# Patient Record
Sex: MALE | Race: WHITE
[De-identification: names, ages, dates, MRNs, and addresses within clinical notes are randomized per-mention and may not be internally consistent; named-entity substitution may affect disease eponyms.]

---

## 2018-03-04 ENCOUNTER — HOSPITAL ENCOUNTER (INPATIENT)
Dept: HOSPITAL 12 - ER | Age: 68
LOS: 17 days | Discharge: NURSING, REHAB OR LONG-TERM CARE | DRG: 885 | End: 2018-03-21
Payer: MEDICARE

## 2018-03-04 VITALS — BODY MASS INDEX: 24.38 KG/M2 | HEIGHT: 72 IN | WEIGHT: 180 LBS

## 2018-03-04 DIAGNOSIS — N28.1: ICD-10-CM

## 2018-03-04 DIAGNOSIS — Z91.81: ICD-10-CM

## 2018-03-04 DIAGNOSIS — I50.30: ICD-10-CM

## 2018-03-04 DIAGNOSIS — F31.2: Primary | ICD-10-CM
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
## 2018-03-04 DIAGNOSIS — F41.9: ICD-10-CM

## 2018-03-04 DIAGNOSIS — Z85.46: ICD-10-CM

## 2018-03-04 DIAGNOSIS — J44.9: ICD-10-CM

## 2018-03-04 DIAGNOSIS — I67.2: ICD-10-CM

## 2018-03-04 DIAGNOSIS — Z74.09: ICD-10-CM

## 2018-03-04 DIAGNOSIS — Z86.718: ICD-10-CM

## 2018-03-04 DIAGNOSIS — F45.8: ICD-10-CM

## 2018-03-04 DIAGNOSIS — N17.0: ICD-10-CM
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
## 2018-03-04 DIAGNOSIS — Z86.79: ICD-10-CM

## 2018-03-04 DIAGNOSIS — Z87.891: ICD-10-CM

## 2018-03-04 DIAGNOSIS — D64.9: ICD-10-CM

## 2018-03-04 DIAGNOSIS — E88.09: ICD-10-CM

## 2018-03-04 DIAGNOSIS — G20: ICD-10-CM

## 2018-03-04 DIAGNOSIS — I13.0: ICD-10-CM

## 2018-03-04 DIAGNOSIS — F03.90: ICD-10-CM

## 2018-03-04 DIAGNOSIS — K21.9: ICD-10-CM

## 2018-03-04 DIAGNOSIS — M89.9: ICD-10-CM

## 2018-03-04 DIAGNOSIS — Z87.820: ICD-10-CM

## 2018-03-04 DIAGNOSIS — D68.59: ICD-10-CM

## 2018-03-04 DIAGNOSIS — I27.20: ICD-10-CM

## 2018-03-04 DIAGNOSIS — N18.9: ICD-10-CM

## 2018-03-04 DIAGNOSIS — G90.9: ICD-10-CM

## 2018-03-04 DIAGNOSIS — S01.01XA: ICD-10-CM

## 2018-03-04 DIAGNOSIS — I70.90: ICD-10-CM

## 2018-03-04 DIAGNOSIS — I95.1: ICD-10-CM
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
## 2018-03-04 PROCEDURE — A4663 DIALYSIS BLOOD PRESSURE CUFF: HCPCS

## 2018-03-04 PROCEDURE — A9150 MISC/EXPER NON-PRESCRIPT DRU: HCPCS

## 2018-03-04 RX ADMIN — Medication PRN MG: at 22:00

## 2018-03-05 VITALS — DIASTOLIC BLOOD PRESSURE: 80 MMHG | SYSTOLIC BLOOD PRESSURE: 132 MMHG

## 2018-03-05 VITALS — SYSTOLIC BLOOD PRESSURE: 157 MMHG | DIASTOLIC BLOOD PRESSURE: 95 MMHG

## 2018-03-05 VITALS — DIASTOLIC BLOOD PRESSURE: 85 MMHG | SYSTOLIC BLOOD PRESSURE: 145 MMHG

## 2018-03-05 VITALS — SYSTOLIC BLOOD PRESSURE: 134 MMHG | DIASTOLIC BLOOD PRESSURE: 70 MMHG

## 2018-03-05 LAB
CHOLEST SERPL-MCNC: 112 MG/DL (ref ?–200)
GLUCOSE SERPL-MCNC: 91 MG/DL (ref 70–115)
HDLC SERPL-MCNC: 68 MG/DL (ref 40–60)
TRIGL SERPL-MCNC: 45 MG/DL (ref 30–150)

## 2018-03-05 RX ADMIN — CARBIDOPA AND LEVODOPA SCH TAB: 25; 250 TABLET ORAL at 20:19

## 2018-03-05 RX ADMIN — LORAZEPAM PRN MG: 0.5 TABLET ORAL at 02:57

## 2018-03-05 RX ADMIN — LAMOTRIGINE SCH MG: 100 TABLET ORAL at 10:00

## 2018-03-05 RX ADMIN — LAMOTRIGINE SCH MG: 100 TABLET ORAL at 20:19

## 2018-03-05 RX ADMIN — NICOTINE SCH MG: 21 PATCH, EXTENDED RELEASE TOPICAL at 17:07

## 2018-03-05 RX ADMIN — CARBIDOPA AND LEVODOPA SCH TAB: 25; 250 TABLET ORAL at 16:59

## 2018-03-05 RX ADMIN — MIDODRINE HYDROCHLORIDE SCH MG: 5 TABLET ORAL at 10:00

## 2018-03-05 RX ADMIN — THERA TABS SCH UDTAB: TAB at 10:00

## 2018-03-05 RX ADMIN — DIVALPROEX SODIUM SCH MG: 500 TABLET, EXTENDED RELEASE ORAL at 16:54

## 2018-03-05 RX ADMIN — CARBIDOPA AND LEVODOPA SCH TAB: 25; 250 TABLET ORAL at 13:57

## 2018-03-05 RX ADMIN — MIDODRINE HYDROCHLORIDE SCH MG: 5 TABLET ORAL at 16:55

## 2018-03-05 RX ADMIN — CARBIDOPA AND LEVODOPA SCH TAB: 25; 250 TABLET ORAL at 09:59

## 2018-03-05 RX ADMIN — DIVALPROEX SODIUM SCH MG: 500 TABLET, EXTENDED RELEASE ORAL at 09:59

## 2018-03-05 RX ADMIN — DOCUSATE SODIUM SCH MG: 100 CAPSULE, LIQUID FILLED ORAL at 09:59

## 2018-03-05 RX ADMIN — NICOTINE SCH MG: 21 PATCH, EXTENDED RELEASE TOPICAL at 14:45

## 2018-03-05 NOTE — NUR
PATIENT RECEIVED FROM ER VIA [facility] AT 2120. PATIENT ALERT/ORIENTED X1 WITH CONFUSION NOTED, 
DISORGANIZED, AGITATED, IRRITABLE, AND UNPREDICTABLE. PATIENT UNSTEADY HISTORY OF FALL. SKIN 
SHOW'S BRUISE TO BACK OF HEAD, BRUISE TO BACK, UPPER BACK BUMP, BRUISE TO LEFT AND RIGHT 
LEGS, CUTS TO LEFT FINGERS AND RIGHT FINGERS. PATIENT ORIENTED TO UNIT. SPOKE WITH GATO BOJORQUEZ 
[phone]. PATIENT FROM [facility] SPOKE WITH [facility] FAXED INFORMATION 
ON PATIENT [phone]. PATIENT RECEIVED PATIENT RIGHTS HANDBOOK, AND ADVISEMENT.

## 2018-03-05 NOTE — NUR
PATIENT RECEIVED IN ACTIVITIES ROOM SOCIALIZING WITH STAFF AND PEERS. PATIENT CALM, 
COOPERATIVE UPON APPROACH. IN NO APPARENT DISTRESS, WILL CONTINUE TO MONITOR. PATIENT USES 
FWW. PATIENT COMPLAINT WITH MEDICATION. NO AGGRESSIVE OR COMBATIVE BEHAVIOR NOTED WILL 
CONTINUE TO MONITOR. BED IN LOWEST POSITION, BED LOCKED, AND BED ALARM ON WHILE IN BED.

## 2018-03-06 VITALS — DIASTOLIC BLOOD PRESSURE: 85 MMHG | SYSTOLIC BLOOD PRESSURE: 121 MMHG

## 2018-03-06 VITALS — DIASTOLIC BLOOD PRESSURE: 91 MMHG | SYSTOLIC BLOOD PRESSURE: 138 MMHG

## 2018-03-06 VITALS — SYSTOLIC BLOOD PRESSURE: 122 MMHG | DIASTOLIC BLOOD PRESSURE: 71 MMHG

## 2018-03-06 RX ADMIN — CARBIDOPA AND LEVODOPA SCH TAB: 25; 250 TABLET ORAL at 08:50

## 2018-03-06 RX ADMIN — MIDODRINE HYDROCHLORIDE SCH MG: 5 TABLET ORAL at 08:51

## 2018-03-06 RX ADMIN — ACETAMINOPHEN PRN MG: 500 TABLET ORAL at 01:47

## 2018-03-06 RX ADMIN — NICOTINE SCH MG: 21 PATCH, EXTENDED RELEASE TOPICAL at 08:49

## 2018-03-06 RX ADMIN — CARBIDOPA AND LEVODOPA SCH TAB: 25; 250 TABLET ORAL at 20:15

## 2018-03-06 RX ADMIN — CARBIDOPA AND LEVODOPA SCH TAB: 25; 250 TABLET ORAL at 12:41

## 2018-03-06 RX ADMIN — LAMOTRIGINE SCH MG: 100 TABLET ORAL at 08:50

## 2018-03-06 RX ADMIN — LORAZEPAM PRN MG: 0.5 TABLET ORAL at 04:14

## 2018-03-06 RX ADMIN — Medication PRN MG: at 01:51

## 2018-03-06 RX ADMIN — DIVALPROEX SODIUM SCH MG: 500 TABLET, EXTENDED RELEASE ORAL at 08:50

## 2018-03-06 RX ADMIN — THERA TABS SCH UDTAB: TAB at 08:49

## 2018-03-06 RX ADMIN — MIDODRINE HYDROCHLORIDE SCH MG: 5 TABLET ORAL at 16:31

## 2018-03-06 RX ADMIN — LORAZEPAM PRN MG: 0.5 TABLET ORAL at 12:41

## 2018-03-06 RX ADMIN — DIVALPROEX SODIUM SCH MG: 500 TABLET, EXTENDED RELEASE ORAL at 16:30

## 2018-03-06 RX ADMIN — DOCUSATE SODIUM SCH MG: 100 CAPSULE, LIQUID FILLED ORAL at 08:49

## 2018-03-06 RX ADMIN — LAMOTRIGINE SCH MG: 100 TABLET ORAL at 20:15

## 2018-03-06 RX ADMIN — CARBIDOPA AND LEVODOPA SCH TAB: 25; 250 TABLET ORAL at 16:30

## 2018-03-06 NOTE — NUR
Initial Discharge Instructions:  Patient currently resides at [facility] [[address] [place] [address]; [phone]]. Per pt, he would like to 
return there upon discharge.  left message for Moses at the facility to confirm 
if he will be able to return there. SW will continue to collaborate with patient and MD 
regarding appropriate discharge plan. SW will form a safe and proper discharge.

## 2018-03-06 NOTE — NUR
RECEIVED PATIENT IN THE DAY ROOM. HE WAS NOTED A/O X 3. HE IS ABLE TO MAKE HIS NEEDS KNOWN 
AND ABLE TO AMBULATE WITH STEADY GAIT. PATIENT WAS NOTED CALM AND COOPERATIVE, PLEASANT, 
HYPERVERBAL, TANGENTAL. FAIR INSIGHT. HE DENIES SI, ABLE TO CFS. MEDICATION COMPLIANT AT 
THIS TIME. SAFETY EMPHASIS BED AT LOWEST POSITION WITH WHEELS LOCKED AND FREQUENT HEAD 
CHECKS. PT ENCOURAGED TO VERBALIZED FEELINGS.

## 2018-03-07 VITALS — SYSTOLIC BLOOD PRESSURE: 145 MMHG | DIASTOLIC BLOOD PRESSURE: 81 MMHG

## 2018-03-07 VITALS — SYSTOLIC BLOOD PRESSURE: 100 MMHG | DIASTOLIC BLOOD PRESSURE: 70 MMHG

## 2018-03-07 VITALS — SYSTOLIC BLOOD PRESSURE: 140 MMHG | DIASTOLIC BLOOD PRESSURE: 70 MMHG

## 2018-03-07 VITALS — DIASTOLIC BLOOD PRESSURE: 80 MMHG | SYSTOLIC BLOOD PRESSURE: 141 MMHG

## 2018-03-07 VITALS — SYSTOLIC BLOOD PRESSURE: 126 MMHG | DIASTOLIC BLOOD PRESSURE: 76 MMHG

## 2018-03-07 VITALS — DIASTOLIC BLOOD PRESSURE: 70 MMHG | SYSTOLIC BLOOD PRESSURE: 111 MMHG

## 2018-03-07 LAB
ALP SERPL-CCNC: 65 U/L (ref 50–136)
ALT SERPL W/O P-5'-P-CCNC: 80 U/L (ref 16–63)
APPEARANCE UR: CLEAR
AST SERPL-CCNC: 70 U/L (ref 15–37)
BASOPHILS # BLD AUTO: 0 K/UL (ref 0–8)
BASOPHILS NFR BLD AUTO: 0.5 % (ref 0–2)
BILIRUB SERPL-MCNC: 0.4 MG/DL (ref 0.2–1)
BILIRUB UR QL STRIP: NEGATIVE
BUN SERPL-MCNC: 24 MG/DL (ref 7–18)
CHLORIDE SERPL-SCNC: 104 MMOL/L (ref 98–107)
CO2 SERPL-SCNC: 25 MMOL/L (ref 21–32)
COLOR UR: (no result)
CREAT SERPL-MCNC: 1.9 MG/DL (ref 0.6–1.3)
CREAT UR-MCNC: < 13 MG/DL (ref 30–125)
EOSINOPHIL # BLD AUTO: 0.1 K/UL (ref 0–0.7)
EOSINOPHIL NFR BLD AUTO: 0.7 % (ref 0–7)
GLUCOSE SERPL-MCNC: 98 MG/DL (ref 74–106)
GLUCOSE UR STRIP-MCNC: NEGATIVE MG/DL
HCT VFR BLD AUTO: 36 % (ref 36.7–47.1)
HGB BLD-MCNC: 12.1 G/DL (ref 12.5–16.3)
HGB UR QL STRIP: NEGATIVE
KETONES UR STRIP-MCNC: NEGATIVE MG/DL
LEUKOCYTE ESTERASE UR QL STRIP: NEGATIVE
LYMPHOCYTES # BLD AUTO: 0.8 K/UL (ref 20–40)
LYMPHOCYTES NFR BLD AUTO: 8.9 % (ref 20.5–51.5)
MAGNESIUM SERPL-MCNC: 2.2 MG/DL (ref 1.8–2.4)
MCH RBC QN AUTO: 30.9 UUG (ref 23.8–33.4)
MCHC RBC AUTO-ENTMCNC: 34 G/DL (ref 32.5–36.3)
MCV RBC AUTO: 92.1 FL (ref 73–96.2)
MONOCYTES # BLD AUTO: 1.1 K/UL (ref 2–10)
MONOCYTES NFR BLD AUTO: 11.8 % (ref 0–11)
NEUTROPHILS # BLD AUTO: 7.3 K/UL (ref 1.8–8.9)
NEUTROPHILS NFR BLD AUTO: 78.1 % (ref 38.5–71.5)
NITRITE UR QL STRIP: NEGATIVE
PH UR STRIP: 7 [PH] (ref 5–8)
PHOSPHATE SERPL-MCNC: 3.2 MG/DL (ref 2.5–4.9)
PLATELET # BLD AUTO: 198 K/UL (ref 152–348)
POTASSIUM SERPL-SCNC: 4.9 MMOL/L (ref 3.5–5.1)
PROT UR QL STRIP: NEGATIVE
PROT UR-MCNC: < 6 MG/DL
RBC # BLD AUTO: 3.91 MIL/UL (ref 4.06–5.63)
SP GR UR STRIP: 1.01 (ref 1–1.03)
TSH SERPL DL<=0.005 MIU/L-ACNC: 1.71 MIU/ML (ref 0.36–3.74)
UROBILINOGEN UR STRIP-MCNC: 0.2 E.U./DL
WBC # BLD AUTO: 9.4 K/UL (ref 3.6–10.2)
WBC #/AREA URNS HPF: (no result) /HPF (ref 0–3)
WS STN SPEC: 6.7 G/DL (ref 6.4–8.2)

## 2018-03-07 RX ADMIN — LAMOTRIGINE SCH MG: 100 TABLET ORAL at 08:06

## 2018-03-07 RX ADMIN — CARBIDOPA AND LEVODOPA SCH TAB: 25; 250 TABLET ORAL at 08:06

## 2018-03-07 RX ADMIN — LORAZEPAM PRN MG: 0.5 TABLET ORAL at 14:35

## 2018-03-07 RX ADMIN — NICOTINE SCH MG: 21 PATCH, EXTENDED RELEASE TOPICAL at 08:06

## 2018-03-07 RX ADMIN — CARBIDOPA AND LEVODOPA SCH TAB: 25; 250 TABLET ORAL at 12:36

## 2018-03-07 RX ADMIN — DIVALPROEX SODIUM SCH MG: 500 TABLET, EXTENDED RELEASE ORAL at 08:06

## 2018-03-07 RX ADMIN — DIVALPROEX SODIUM SCH MG: 500 TABLET, EXTENDED RELEASE ORAL at 16:43

## 2018-03-07 RX ADMIN — CARBIDOPA AND LEVODOPA SCH TAB: 25; 250 TABLET ORAL at 16:43

## 2018-03-07 RX ADMIN — LAMOTRIGINE SCH MG: 100 TABLET ORAL at 20:15

## 2018-03-07 RX ADMIN — MIDODRINE HYDROCHLORIDE SCH MG: 5 TABLET ORAL at 08:44

## 2018-03-07 RX ADMIN — DOCUSATE SODIUM SCH MG: 100 CAPSULE, LIQUID FILLED ORAL at 08:06

## 2018-03-07 RX ADMIN — ACETAMINOPHEN PRN MG: 500 TABLET ORAL at 06:19

## 2018-03-07 RX ADMIN — CARBIDOPA AND LEVODOPA SCH TAB: 25; 250 TABLET ORAL at 20:15

## 2018-03-07 RX ADMIN — MIDODRINE HYDROCHLORIDE SCH MG: 5 TABLET ORAL at 16:42

## 2018-03-07 RX ADMIN — LORAZEPAM PRN MG: 0.5 TABLET ORAL at 03:49

## 2018-03-07 RX ADMIN — THERA TABS SCH UDTAB: TAB at 08:06

## 2018-03-07 RX ADMIN — QUETIAPINE SCH MG: 100 TABLET, FILM COATED ORAL at 20:16

## 2018-03-07 NOTE — NUR
RECEIVED PATIENT IN THE DAY ROOM. HE WAS NOTED A/O X 3, AMBULATES INDEPENDENTLY WITH STEADY 
GAIT AND BALANCE, TREMORS R/T HX OF PARKINSON'S DISEASE. PATIENT WAS COOPERATIVE, PLEASANT, 
HYPERVERBAL, TANGENTIAL. FAIR INSIGHT. DENIES SI/HI, ABLE TO CFS. PATIENT'S BRUISING AND 
CUTS THAT WERE NOTED IN H&P HAVE IMPROVED. PATIENT HAS NOT COMPLAINED OF PAIN. COMPLIANT TO 
MEDICATIONS AT THIS TIME. SAFETY EMPHASIZED; BED AT LOWEST POSITION WITH WHEELS LOCKED AND 
FREQUENT HEAD CHECKS. PATIENT ENCOURAGED TO VERBALIZE FEELINGS.

## 2018-03-07 NOTE — NUR
PATIENT WOKE UP AND CAME TO THE NURSING STATION ASKING FOR A SLEEPING PILL. UPON ASSESSMENT, 
PATIENT WAS NOTED A LITTLE ANXIOUS. ATIVAN 0.5MG PO PRN WAS GIVEN FOR ANXIETY. WILL CONTINUE 
TO MONITOR.

## 2018-03-07 NOTE — NUR
PATIENT SLEPT FOR APPROX 4.00 HRS THROUGH THE NIGHT. HE CONTINUE HYPERVERBAL, FLIGHT OF 
IDEAS; HOWEVER NO SI, NO AGGRESSIVE BX WAS NOTED AT THIS TIME. COMPLIANT WITH ADLs. PT   C/O 
LOWER BACK PAIN, TYLENOL 500MG ES PO PRN WAS GIVEN. WILL CONTINUE TO MONITOR.

## 2018-03-07 NOTE — NUR
AT APPROXIMATELY 2229, PATIENT WAS WALKING DOWN THE HALLWAY FROM DAY ROOM TO HIS ROOM AND 
WAS NOTED TO HAVE PALE SKIN. PATIENT WAS IMMEDIATELY SEATED ON A CHAIR IN THE HALLWAY AND 
V/S WERE ASSESSED: B/P OF 89/56; HR 78; 96% O2 SAT, RESPONSIVE, A/O X 2. PATIENT REPORTED 
LIGHT HEADEDNESS AND DIZZINESS. TRANSFERRED PATIENT TO BED, PLACED ON TRENDELENBURG POSITION 
AND CONTINUED MONITORING V/S EVERY 5 MINUTES FOR 30 MINUTES. SYSTOLIC B/P WAS STABLE AND AT 
A CONSISTENT RANGE BETWEEN 90 , DIASTOLIC B/P WAS STABLE AND AT A CONSISTENT RANGE 
BETWEEN 60 AND 70, PATIENT ASLEEP BUT AROUSABLE, RESPONSIVE, LETHARGIC. BED POSITION WAS 
CHANGED FROM TRENDELENBURG TO LITHOTOMY POSITION, V/S WERE ASSESSED AFTER 10 MINUTES AT 
2310: B/P /70; HR 80, 96% O2 SAT, RR 17. PATIENT WAS NOTED TO BE ASLEEP AND STABLE. 
WILL CONTINUE TO FREQUENTLY MONITOR PATIENT.

## 2018-03-08 VITALS — SYSTOLIC BLOOD PRESSURE: 131 MMHG | DIASTOLIC BLOOD PRESSURE: 82 MMHG

## 2018-03-08 VITALS — SYSTOLIC BLOOD PRESSURE: 146 MMHG | DIASTOLIC BLOOD PRESSURE: 78 MMHG

## 2018-03-08 VITALS — SYSTOLIC BLOOD PRESSURE: 132 MMHG | DIASTOLIC BLOOD PRESSURE: 77 MMHG

## 2018-03-08 LAB
ALP SERPL-CCNC: 66 U/L (ref 50–136)
ALT SERPL W/O P-5'-P-CCNC: 43 U/L (ref 16–63)
AST SERPL-CCNC: 56 U/L (ref 15–37)
BASOPHILS # BLD AUTO: 0 K/UL (ref 0–8)
BASOPHILS NFR BLD AUTO: 0.5 % (ref 0–2)
BILIRUB SERPL-MCNC: 0.4 MG/DL (ref 0.2–1)
BUN SERPL-MCNC: 25 MG/DL (ref 7–18)
CHLORIDE SERPL-SCNC: 106 MMOL/L (ref 98–107)
CK SERPL-CCNC: 427 U/L (ref 39–308)
CO2 SERPL-SCNC: 30 MMOL/L (ref 21–32)
CREAT SERPL-MCNC: 1.8 MG/DL (ref 0.6–1.3)
EOSINOPHIL # BLD AUTO: 0.1 K/UL (ref 0–0.7)
EOSINOPHIL NFR BLD AUTO: 1.7 % (ref 0–7)
GLUCOSE SERPL-MCNC: 100 MG/DL (ref 74–106)
HCT VFR BLD AUTO: 36 % (ref 36.7–47.1)
HGB BLD-MCNC: 12 G/DL (ref 12.5–16.3)
LYMPHOCYTES # BLD AUTO: 1.2 K/UL (ref 20–40)
LYMPHOCYTES NFR BLD AUTO: 14.7 % (ref 20.5–51.5)
MAGNESIUM SERPL-MCNC: 2.3 MG/DL (ref 1.8–2.4)
MCH RBC QN AUTO: 30.8 UUG (ref 23.8–33.4)
MCHC RBC AUTO-ENTMCNC: 33 G/DL (ref 32.5–36.3)
MCV RBC AUTO: 92.5 FL (ref 73–96.2)
MONOCYTES # BLD AUTO: 1 K/UL (ref 2–10)
MONOCYTES NFR BLD AUTO: 12.9 % (ref 0–11)
NEUTROPHILS # BLD AUTO: 5.6 K/UL (ref 1.8–8.9)
NEUTROPHILS NFR BLD AUTO: 70.2 % (ref 38.5–71.5)
PHOSPHATE SERPL-MCNC: 3.4 MG/DL (ref 2.5–4.9)
PLATELET # BLD AUTO: 219 K/UL (ref 152–348)
POTASSIUM SERPL-SCNC: 5.1 MMOL/L (ref 3.5–5.1)
RBC # BLD AUTO: 3.89 MIL/UL (ref 4.06–5.63)
WBC # BLD AUTO: 8 K/UL (ref 3.6–10.2)
WS STN SPEC: 6.7 G/DL (ref 6.4–8.2)

## 2018-03-08 RX ADMIN — ALUMINUM HYDROXIDE, MAGNESIUM HYDROXIDE, AND SIMETHICONE PRN ML: 200; 200; 20 SUSPENSION ORAL at 16:15

## 2018-03-08 RX ADMIN — LORAZEPAM PRN MG: 0.5 TABLET ORAL at 23:54

## 2018-03-08 RX ADMIN — THERA TABS SCH UDTAB: TAB at 08:37

## 2018-03-08 RX ADMIN — LAMOTRIGINE SCH MG: 100 TABLET ORAL at 20:28

## 2018-03-08 RX ADMIN — DIVALPROEX SODIUM SCH MG: 500 TABLET, EXTENDED RELEASE ORAL at 16:23

## 2018-03-08 RX ADMIN — MIDODRINE HYDROCHLORIDE SCH MG: 5 TABLET ORAL at 16:27

## 2018-03-08 RX ADMIN — MIDODRINE HYDROCHLORIDE SCH MG: 5 TABLET ORAL at 08:38

## 2018-03-08 RX ADMIN — CARBIDOPA AND LEVODOPA SCH TAB: 25; 250 TABLET ORAL at 12:49

## 2018-03-08 RX ADMIN — CARBIDOPA AND LEVODOPA SCH TAB: 25; 250 TABLET ORAL at 08:37

## 2018-03-08 RX ADMIN — NICOTINE SCH MG: 21 PATCH, EXTENDED RELEASE TOPICAL at 08:38

## 2018-03-08 RX ADMIN — DOCUSATE SODIUM SCH MG: 100 CAPSULE, LIQUID FILLED ORAL at 08:37

## 2018-03-08 RX ADMIN — CARBIDOPA AND LEVODOPA SCH TAB: 25; 250 TABLET ORAL at 20:28

## 2018-03-08 RX ADMIN — CARBIDOPA AND LEVODOPA SCH TAB: 25; 250 TABLET ORAL at 16:24

## 2018-03-08 RX ADMIN — DIVALPROEX SODIUM SCH MG: 500 TABLET, EXTENDED RELEASE ORAL at 08:37

## 2018-03-08 RX ADMIN — LAMOTRIGINE SCH MG: 100 TABLET ORAL at 08:37

## 2018-03-08 RX ADMIN — QUETIAPINE SCH MG: 100 TABLET, FILM COATED ORAL at 20:29

## 2018-03-08 NOTE — NUR
PATIENT WHILE IN THE DAY ROOM WAS NOTED WITH DESTRUCTIVE BX. HE PULLED FEW DECORATIONS, 
THROW MAGAZINES AND PAPER ON THE FLOOR. PT WAS REDIRECTED, HOWEVER, HE BECOME SARCASTIC 
REFUSING TO CLEAN AFTER HIMSELF. WILL CONTINUE TO MONITOR.

## 2018-03-09 VITALS — SYSTOLIC BLOOD PRESSURE: 140 MMHG | DIASTOLIC BLOOD PRESSURE: 84 MMHG

## 2018-03-09 VITALS — DIASTOLIC BLOOD PRESSURE: 81 MMHG | SYSTOLIC BLOOD PRESSURE: 142 MMHG

## 2018-03-09 VITALS — SYSTOLIC BLOOD PRESSURE: 138 MMHG | DIASTOLIC BLOOD PRESSURE: 75 MMHG

## 2018-03-09 VITALS — DIASTOLIC BLOOD PRESSURE: 76 MMHG | SYSTOLIC BLOOD PRESSURE: 140 MMHG

## 2018-03-09 VITALS — SYSTOLIC BLOOD PRESSURE: 139 MMHG | DIASTOLIC BLOOD PRESSURE: 76 MMHG

## 2018-03-09 VITALS — DIASTOLIC BLOOD PRESSURE: 80 MMHG | SYSTOLIC BLOOD PRESSURE: 152 MMHG

## 2018-03-09 LAB
ALBUMIN SERPL ELPH-MCNC: 3.4 G/DL (ref 2.9–4.4)
ALBUMIN/GLOB SERPL: 1.3 {RATIO} (ref 0.7–1.7)
ALPHA1 GLOB SERPL ELPH-MCNC: 0.3 G/DL (ref 0–0.4)
ALPHA2 GLOB SERPL ELPH-MCNC: 0.7 G/DL (ref 0.4–1)
B-GLOBULIN SERPL ELPH-MCNC: 0.8 G/DL (ref 0.7–1.3)
GAMMA GLOB SERPL ELPH-MCNC: 0.7 G/DL (ref 0.4–1.8)
GLOBULIN SER CALC-MCNC: 2.6 G/DL (ref 2.2–3.9)

## 2018-03-09 RX ADMIN — CARBIDOPA AND LEVODOPA SCH TAB: 25; 250 TABLET ORAL at 21:30

## 2018-03-09 RX ADMIN — DIVALPROEX SODIUM SCH MG: 500 TABLET, EXTENDED RELEASE ORAL at 08:10

## 2018-03-09 RX ADMIN — LAMOTRIGINE SCH MG: 100 TABLET ORAL at 21:31

## 2018-03-09 RX ADMIN — DIVALPROEX SODIUM SCH MG: 500 TABLET, EXTENDED RELEASE ORAL at 16:30

## 2018-03-09 RX ADMIN — NICOTINE SCH MG: 21 PATCH, EXTENDED RELEASE TOPICAL at 08:10

## 2018-03-09 RX ADMIN — DOCUSATE SODIUM SCH MG: 100 CAPSULE, LIQUID FILLED ORAL at 08:10

## 2018-03-09 RX ADMIN — THERA TABS SCH UDTAB: TAB at 08:10

## 2018-03-09 RX ADMIN — CARBIDOPA AND LEVODOPA SCH TAB: 25; 250 TABLET ORAL at 16:30

## 2018-03-09 RX ADMIN — CARBIDOPA AND LEVODOPA SCH TAB: 25; 250 TABLET ORAL at 13:35

## 2018-03-09 RX ADMIN — MIDODRINE HYDROCHLORIDE SCH MG: 5 TABLET ORAL at 21:00

## 2018-03-09 RX ADMIN — LAMOTRIGINE SCH MG: 100 TABLET ORAL at 08:10

## 2018-03-09 RX ADMIN — QUETIAPINE SCH MG: 100 TABLET, FILM COATED ORAL at 21:31

## 2018-03-09 RX ADMIN — MIDODRINE HYDROCHLORIDE SCH MG: 5 TABLET ORAL at 08:17

## 2018-03-09 RX ADMIN — CARBIDOPA AND LEVODOPA SCH TAB: 25; 250 TABLET ORAL at 08:10

## 2018-03-09 NOTE — NUR
Gps/Lvn- Anxious, talkative, needed to be redirected. B/P sitting 140/76 HR 97, standing 
131/74 HR 94 , 02 sat 96% on room air. Ativan 0.5 mg was given po, minimal results. Shelia Alex NP was notified, in to see patient orders received.

## 2018-03-09 NOTE — NUR
PATIENT WHILE IN THE DAY ROOM WAS NOTED WITH DESTRUCTIVE BX. HE PULLED FEW DECORATIONS, 
THROW MAGAZINES AND PAPER ON THE FLOOR. PT WAS REDIRECTED AND WAS COOPERATIVE WITH RN. WHILE 
IN HIS ROOM, PT WAS EXHIBITING DISRUPTIVE BX (SINGING OUT LOUD EVEN WHEN ASKED TO STOP), WAS 
UNCOOPERATIVE AND AGGRESSIVE TOWARDS OTHER STAFF (OTHER NURSES). RN WAS ABLE TO REDIRECT PT, 
BUT STILL EXHIBITED ANXIETY BX, ATIVAN 0.5 MG PO WAS GIVEN @ 2359 TO HELP REDUCE ANXIETY. PT 
WAS REASSESSED WITHIN ONE HOUR AND MEDICATION NOT EFFECTIVE. PT STILL EXHIBITED ANXIETY BX, 
HYPERVERBAL, TANGENTAL SPEECH, COOPERATIVE. PT STAYED AWAKE THE WHOLE NIGHT AND CONTINUED TO 
BE AGGRESSIVE TOWARDS OTHER STAFF. AT APPROX 0330, PT CAME OUT OF ROOM EXHIBITING 
DESTRUCTIVE BX AND TALKING LOUDLY WHILE AMBULATING IN HALLWAY WITH FWW. LVN (KARLOS FITCH) 
ASKED PT TO GO BACK IN HIS ROOM AND TO LOWER HIS VOICE. PATIENT BECAME AGITATED, RAISED HIS 
FWW, AND BEGAN TO SWING AND HIT LVN WITH FWW. PATIENT WAS NEUTRALIZED BY STAFF AND PLACED IN 
LIZETTE CHAIR NEXT TO NURSE STATION FOR SAFETY. WILL CONTINUE TO MONITOR.

## 2018-03-10 VITALS — DIASTOLIC BLOOD PRESSURE: 86 MMHG | SYSTOLIC BLOOD PRESSURE: 131 MMHG

## 2018-03-10 VITALS — DIASTOLIC BLOOD PRESSURE: 75 MMHG | SYSTOLIC BLOOD PRESSURE: 146 MMHG

## 2018-03-10 VITALS — DIASTOLIC BLOOD PRESSURE: 72 MMHG | SYSTOLIC BLOOD PRESSURE: 153 MMHG

## 2018-03-10 RX ADMIN — QUETIAPINE SCH MG: 100 TABLET, FILM COATED ORAL at 20:38

## 2018-03-10 RX ADMIN — LAMOTRIGINE SCH MG: 100 TABLET ORAL at 09:00

## 2018-03-10 RX ADMIN — MIDODRINE HYDROCHLORIDE SCH MG: 5 TABLET ORAL at 20:39

## 2018-03-10 RX ADMIN — CARBIDOPA AND LEVODOPA SCH TAB: 25; 250 TABLET ORAL at 09:00

## 2018-03-10 RX ADMIN — CARBIDOPA AND LEVODOPA SCH TAB: 25; 250 TABLET ORAL at 13:00

## 2018-03-10 RX ADMIN — CARBIDOPA AND LEVODOPA SCH TAB: 25; 250 TABLET ORAL at 17:03

## 2018-03-10 RX ADMIN — THERA TABS SCH UDTAB: TAB at 09:00

## 2018-03-10 RX ADMIN — NICOTINE SCH MG: 21 PATCH, EXTENDED RELEASE TOPICAL at 09:00

## 2018-03-10 RX ADMIN — DOCUSATE SODIUM SCH MG: 100 CAPSULE, LIQUID FILLED ORAL at 09:00

## 2018-03-10 RX ADMIN — DIVALPROEX SODIUM SCH MG: 500 TABLET, EXTENDED RELEASE ORAL at 17:02

## 2018-03-10 RX ADMIN — MIDODRINE HYDROCHLORIDE SCH MG: 5 TABLET ORAL at 09:00

## 2018-03-10 RX ADMIN — CARBIDOPA AND LEVODOPA SCH TAB: 25; 250 TABLET ORAL at 20:38

## 2018-03-10 RX ADMIN — LAMOTRIGINE SCH MG: 100 TABLET ORAL at 20:38

## 2018-03-10 RX ADMIN — DIVALPROEX SODIUM SCH MG: 500 TABLET, EXTENDED RELEASE ORAL at 09:00

## 2018-03-10 RX ADMIN — LORAZEPAM PRN MG: 0.5 TABLET ORAL at 10:56

## 2018-03-10 NOTE — NUR
GPS/NSG

Patient first observed awake in room, alert, oriented to name and place. Patient refused 
vital signs. Patient visible at the nursing station asking for medication, pleasant fair 
appearance, appropriate approach to staff. Continue to monitor for safety.

## 2018-03-10 NOTE — NUR
Patient awake and visible on the unit, patient loud and required redirecting. Patient under 
the impression that HS medication was administered at 1700, patient appeared angry when 
redirected. Continue to monitor.

## 2018-03-10 NOTE — NUR
GPS/NSG

Proamatine due at 2100 held for B/P parameters. Continue to monitor orthostatic blood 
pressure before administration.

## 2018-03-11 VITALS — DIASTOLIC BLOOD PRESSURE: 85 MMHG | SYSTOLIC BLOOD PRESSURE: 159 MMHG

## 2018-03-11 VITALS — DIASTOLIC BLOOD PRESSURE: 74 MMHG | SYSTOLIC BLOOD PRESSURE: 152 MMHG

## 2018-03-11 VITALS — SYSTOLIC BLOOD PRESSURE: 127 MMHG | DIASTOLIC BLOOD PRESSURE: 73 MMHG

## 2018-03-11 LAB
ALP SERPL-CCNC: 69 U/L (ref 50–136)
ALT SERPL W/O P-5'-P-CCNC: 27 U/L (ref 16–63)
AST SERPL-CCNC: 37 U/L (ref 15–37)
BASOPHILS # BLD AUTO: 0 K/UL (ref 0–8)
BASOPHILS NFR BLD AUTO: 0.5 % (ref 0–2)
BILIRUB SERPL-MCNC: 0.4 MG/DL (ref 0.2–1)
BUN SERPL-MCNC: 19 MG/DL (ref 7–18)
CHLORIDE SERPL-SCNC: 108 MMOL/L (ref 98–107)
CO2 SERPL-SCNC: 26 MMOL/L (ref 21–32)
CREAT SERPL-MCNC: 1.9 MG/DL (ref 0.6–1.3)
EOSINOPHIL # BLD AUTO: 0.2 K/UL (ref 0–0.7)
EOSINOPHIL NFR BLD AUTO: 2.1 % (ref 0–7)
EOSINOPHIL NFR BLD MANUAL: 2 % (ref 0–8)
GLUCOSE SERPL-MCNC: 90 MG/DL (ref 74–106)
HCT VFR BLD AUTO: 35.9 % (ref 36.7–47.1)
HGB BLD-MCNC: 11.9 G/DL (ref 12.5–16.3)
LYMPHOCYTES # BLD AUTO: 1.5 K/UL (ref 20–40)
LYMPHOCYTES NFR BLD AUTO: 16.7 % (ref 20.5–51.5)
LYMPHOCYTES NFR BLD MANUAL: 17 % (ref 20–40)
MAGNESIUM SERPL-MCNC: 2.4 MG/DL (ref 1.8–2.4)
MCH RBC QN AUTO: 30.7 UUG (ref 23.8–33.4)
MCHC RBC AUTO-ENTMCNC: 33 G/DL (ref 32.5–36.3)
MCV RBC AUTO: 92.8 FL (ref 73–96.2)
MONOCYTES # BLD AUTO: 1.4 K/UL (ref 2–10)
MONOCYTES NFR BLD AUTO: 15.8 % (ref 0–11)
MONOCYTES NFR BLD MANUAL: 11 % (ref 2–10)
MYELOCYTES NFR BLD MANUAL: 1 % (ref 0–0)
NEUTROPHILS # BLD AUTO: 5.8 K/UL (ref 1.8–8.9)
NEUTROPHILS NFR BLD AUTO: 64.9 % (ref 38.5–71.5)
NEUTS BAND NFR BLD MANUAL: 1 % (ref 0–10)
NEUTS SEG NFR BLD MANUAL: 68 % (ref 42–75)
PHOSPHATE SERPL-MCNC: 3.4 MG/DL (ref 2.5–4.9)
PLATELET # BLD AUTO: 270 K/UL (ref 152–348)
POTASSIUM SERPL-SCNC: 4.7 MMOL/L (ref 3.5–5.1)
RBC # BLD AUTO: 3.87 MIL/UL (ref 4.06–5.63)
WBC # BLD AUTO: 9 K/UL (ref 3.6–10.2)
WS STN SPEC: 7.1 G/DL (ref 6.4–8.2)

## 2018-03-11 RX ADMIN — DIVALPROEX SODIUM SCH MG: 500 TABLET, EXTENDED RELEASE ORAL at 09:13

## 2018-03-11 RX ADMIN — LAMOTRIGINE SCH MG: 100 TABLET ORAL at 09:15

## 2018-03-11 RX ADMIN — LAMOTRIGINE SCH MG: 100 TABLET ORAL at 20:43

## 2018-03-11 RX ADMIN — DOCUSATE SODIUM SCH MG: 100 CAPSULE, LIQUID FILLED ORAL at 09:13

## 2018-03-11 RX ADMIN — CARBIDOPA AND LEVODOPA SCH TAB: 25; 250 TABLET ORAL at 09:13

## 2018-03-11 RX ADMIN — NICOTINE SCH MG: 21 PATCH, EXTENDED RELEASE TOPICAL at 12:17

## 2018-03-11 RX ADMIN — LORAZEPAM PRN MG: 0.5 TABLET ORAL at 12:06

## 2018-03-11 RX ADMIN — CARBIDOPA AND LEVODOPA SCH TAB: 25; 250 TABLET ORAL at 12:06

## 2018-03-11 RX ADMIN — DIVALPROEX SODIUM SCH MG: 250 TABLET, EXTENDED RELEASE ORAL at 16:57

## 2018-03-11 RX ADMIN — CARBIDOPA AND LEVODOPA SCH TAB: 25; 250 TABLET ORAL at 16:57

## 2018-03-11 RX ADMIN — CARBIDOPA AND LEVODOPA SCH TAB: 25; 250 TABLET ORAL at 20:43

## 2018-03-11 RX ADMIN — ACETAMINOPHEN PRN MG: 500 TABLET ORAL at 15:39

## 2018-03-11 RX ADMIN — THERA TABS SCH UDTAB: TAB at 09:13

## 2018-03-11 RX ADMIN — QUETIAPINE SCH MG: 100 TABLET, FILM COATED ORAL at 20:44

## 2018-03-11 RX ADMIN — DIVALPROEX SODIUM SCH MG: 250 TABLET, EXTENDED RELEASE ORAL at 12:13

## 2018-03-11 RX ADMIN — MIDODRINE HYDROCHLORIDE SCH MG: 5 TABLET ORAL at 09:00

## 2018-03-11 NOTE — NUR
GPS/NSG

Patient first observed awake on unit with labile mood, blunted affect. Patient visible in 
and out of room, compliant with medication. Proamatine due at 2100 held for B/P parameters. 
Continue to monitor orthostatic blood pressure before administration. Patient observed at 
nursing station periodically with increased agitation, demanding, verbally abusive towards 
staff inappropriate behavior loud, disruptive, taking things posted on the wall, banging on 
the tables in activity room, responding to internal stimuli, reinforcement by security was 
necessary to reduce level of agitation, patient was receptive, will continue to monitor 
behavior for patient safety.

## 2018-03-12 VITALS — SYSTOLIC BLOOD PRESSURE: 142 MMHG | DIASTOLIC BLOOD PRESSURE: 85 MMHG

## 2018-03-12 VITALS — DIASTOLIC BLOOD PRESSURE: 81 MMHG | SYSTOLIC BLOOD PRESSURE: 138 MMHG

## 2018-03-12 VITALS — SYSTOLIC BLOOD PRESSURE: 148 MMHG | DIASTOLIC BLOOD PRESSURE: 75 MMHG

## 2018-03-12 RX ADMIN — THERA TABS SCH UDTAB: TAB at 09:00

## 2018-03-12 RX ADMIN — CARBIDOPA AND LEVODOPA SCH TAB: 25; 250 TABLET ORAL at 20:47

## 2018-03-12 RX ADMIN — CARBIDOPA AND LEVODOPA SCH TAB: 25; 250 TABLET ORAL at 12:26

## 2018-03-12 RX ADMIN — LORAZEPAM PRN MG: 0.5 TABLET ORAL at 08:35

## 2018-03-12 RX ADMIN — THERA TABS SCH UDTAB: TAB at 08:35

## 2018-03-12 RX ADMIN — DOCUSATE SODIUM SCH MG: 100 CAPSULE, LIQUID FILLED ORAL at 08:34

## 2018-03-12 RX ADMIN — BACITRACIN ZINC AND POLYMYXIN B SULFATE SCH APPLIC: 500; 10000 OINTMENT TOPICAL at 17:59

## 2018-03-12 RX ADMIN — DIVALPROEX SODIUM SCH MG: 250 TABLET, EXTENDED RELEASE ORAL at 08:34

## 2018-03-12 RX ADMIN — LAMOTRIGINE SCH MG: 100 TABLET ORAL at 20:47

## 2018-03-12 RX ADMIN — BACITRACIN ZINC AND POLYMYXIN B SULFATE SCH APPLIC: 500; 10000 OINTMENT TOPICAL at 20:51

## 2018-03-12 RX ADMIN — NICOTINE SCH MG: 21 PATCH, EXTENDED RELEASE TOPICAL at 08:35

## 2018-03-12 RX ADMIN — CARBIDOPA AND LEVODOPA SCH TAB: 25; 250 TABLET ORAL at 08:35

## 2018-03-12 RX ADMIN — CARBIDOPA AND LEVODOPA SCH TAB: 25; 250 TABLET ORAL at 17:59

## 2018-03-12 RX ADMIN — QUETIAPINE SCH MG: 100 TABLET, FILM COATED ORAL at 20:47

## 2018-03-12 RX ADMIN — DIVALPROEX SODIUM SCH MG: 250 TABLET, EXTENDED RELEASE ORAL at 17:59

## 2018-03-12 RX ADMIN — Medication SCH MG: at 20:48

## 2018-03-12 RX ADMIN — Medication SCH MG: at 12:26

## 2018-03-12 RX ADMIN — LAMOTRIGINE SCH MG: 100 TABLET ORAL at 08:35

## 2018-03-12 NOTE — NUR
Noted dried cuts on fingers, antibiotic treatment initiated. Eating fairly. Calm and 
compliant with care.

## 2018-03-13 VITALS — SYSTOLIC BLOOD PRESSURE: 140 MMHG | DIASTOLIC BLOOD PRESSURE: 74 MMHG

## 2018-03-13 VITALS — DIASTOLIC BLOOD PRESSURE: 78 MMHG | SYSTOLIC BLOOD PRESSURE: 123 MMHG

## 2018-03-13 VITALS — SYSTOLIC BLOOD PRESSURE: 124 MMHG | DIASTOLIC BLOOD PRESSURE: 91 MMHG

## 2018-03-13 RX ADMIN — BACITRACIN ZINC AND POLYMYXIN B SULFATE SCH APPLIC: 500; 10000 OINTMENT TOPICAL at 20:34

## 2018-03-13 RX ADMIN — THERA TABS SCH UDTAB: TAB at 09:42

## 2018-03-13 RX ADMIN — CARBIDOPA AND LEVODOPA SCH TAB: 25; 250 TABLET ORAL at 10:36

## 2018-03-13 RX ADMIN — ACETAMINOPHEN PRN MG: 500 TABLET ORAL at 03:18

## 2018-03-13 RX ADMIN — CARBIDOPA AND LEVODOPA SCH TAB: 25; 250 TABLET ORAL at 17:59

## 2018-03-13 RX ADMIN — DOCUSATE SODIUM SCH MG: 100 CAPSULE, LIQUID FILLED ORAL at 09:41

## 2018-03-13 RX ADMIN — LORAZEPAM PRN MG: 0.5 TABLET ORAL at 14:02

## 2018-03-13 RX ADMIN — BACITRACIN ZINC AND POLYMYXIN B SULFATE SCH APPLIC: 500; 10000 OINTMENT TOPICAL at 09:42

## 2018-03-13 RX ADMIN — LAMOTRIGINE SCH MG: 100 TABLET ORAL at 09:41

## 2018-03-13 RX ADMIN — DIVALPROEX SODIUM SCH MG: 250 TABLET, EXTENDED RELEASE ORAL at 17:59

## 2018-03-13 RX ADMIN — Medication SCH MG: at 21:00

## 2018-03-13 RX ADMIN — Medication SCH MG: at 09:41

## 2018-03-13 RX ADMIN — NICOTINE SCH MG: 21 PATCH, EXTENDED RELEASE TOPICAL at 09:42

## 2018-03-13 RX ADMIN — CARBIDOPA AND LEVODOPA SCH TAB: 25; 250 TABLET ORAL at 13:48

## 2018-03-13 RX ADMIN — DIVALPROEX SODIUM SCH MG: 250 TABLET, EXTENDED RELEASE ORAL at 09:41

## 2018-03-13 RX ADMIN — QUETIAPINE SCH MG: 25 TABLET, FILM COATED ORAL at 17:59

## 2018-03-13 RX ADMIN — LAMOTRIGINE SCH MG: 100 TABLET ORAL at 20:34

## 2018-03-13 RX ADMIN — ACETAMINOPHEN PRN MG: 500 TABLET ORAL at 20:45

## 2018-03-13 RX ADMIN — QUETIAPINE SCH MG: 25 TABLET, FILM COATED ORAL at 09:41

## 2018-03-13 RX ADMIN — CARBIDOPA AND LEVODOPA SCH TAB: 25; 250 TABLET ORAL at 20:34

## 2018-03-13 RX ADMIN — QUETIAPINE SCH MG: 100 TABLET, FILM COATED ORAL at 20:34

## 2018-03-13 NOTE — NUR
c/o back pain, stating she hurt her shoulder and back two weeks ago, was given tylenol 500mg 
po per MD order as requested by pt. sleeping comfortably in bed at this time.

## 2018-03-13 NOTE — NUR
PATIENT V/S: B/P 101/57; PULSE 83BPM; RESPIRATION 17BREATHS/MIN AND O2SAT 94% AT ROOM AIR. 
PT NOTED ASLEEP IN HIS BED. WILL CONTINUE TO MONITOR CLOSELY.

## 2018-03-13 NOTE — NUR
PATIENT WAS NOTED DROWSY, BUT RESPONSIVE WHILE SITTING IN A CHAIR IN THE DAY ROOM. PT WAS 
THEN TRANSFERRED TO A WHEEL CHAIR AND TAKEN TO HIS BED. V/S:B/P 92/57, PULSE 82, RESPIRATION 
17 BREATHS PER MIN. O2 SAT AT ROOM AIR 92%. DENIES PAIN OR DISCOMFORT.  PATIENT WAS PLACED 
IN HIS BED WITH LEGS ELEVATED. WILL CONTINUE TO MONITOR CLOSELY.

## 2018-03-13 NOTE — NUR
RECEIVED PATIENT IN HIS ROOM IN BED. HE WAS NOTED ASLEEP BUT EASILY AWAKEN. HE IS A/O X 3. 
BLUNTED AFFECT, DEPRESSED MOOD. MEDICATION COMPLIANT AT THIS TIME. SNACKS WERE PROVIDE. 
DENIES SI/HI. NO AGGRESSIVE BX NOTED AT THIS TIME.

## 2018-03-14 VITALS — SYSTOLIC BLOOD PRESSURE: 143 MMHG | DIASTOLIC BLOOD PRESSURE: 78 MMHG

## 2018-03-14 VITALS — DIASTOLIC BLOOD PRESSURE: 82 MMHG | SYSTOLIC BLOOD PRESSURE: 115 MMHG

## 2018-03-14 RX ADMIN — LAMOTRIGINE SCH MG: 100 TABLET ORAL at 20:10

## 2018-03-14 RX ADMIN — QUETIAPINE SCH MG: 25 TABLET, FILM COATED ORAL at 09:00

## 2018-03-14 RX ADMIN — LORAZEPAM PRN MG: 0.5 TABLET ORAL at 10:38

## 2018-03-14 RX ADMIN — CARBIDOPA AND LEVODOPA SCH TAB: 25; 250 TABLET ORAL at 20:11

## 2018-03-14 RX ADMIN — CARBIDOPA AND LEVODOPA SCH TAB: 25; 250 TABLET ORAL at 13:58

## 2018-03-14 RX ADMIN — DIVALPROEX SODIUM SCH MG: 250 TABLET, EXTENDED RELEASE ORAL at 17:00

## 2018-03-14 RX ADMIN — BACITRACIN ZINC AND POLYMYXIN B SULFATE SCH APPLIC: 500; 10000 OINTMENT TOPICAL at 20:19

## 2018-03-14 RX ADMIN — NICOTINE SCH MG: 21 PATCH, EXTENDED RELEASE TOPICAL at 09:00

## 2018-03-14 RX ADMIN — QUETIAPINE SCH MG: 25 TABLET, FILM COATED ORAL at 17:00

## 2018-03-14 RX ADMIN — THERA TABS SCH UDTAB: TAB at 09:00

## 2018-03-14 RX ADMIN — Medication SCH MG: at 09:00

## 2018-03-14 RX ADMIN — DIVALPROEX SODIUM SCH MG: 250 TABLET, EXTENDED RELEASE ORAL at 09:00

## 2018-03-14 RX ADMIN — BACITRACIN ZINC AND POLYMYXIN B SULFATE SCH APPLIC: 500; 10000 OINTMENT TOPICAL at 09:00

## 2018-03-14 RX ADMIN — LAMOTRIGINE SCH MG: 100 TABLET ORAL at 09:00

## 2018-03-14 RX ADMIN — CARBIDOPA AND LEVODOPA SCH TAB: 25; 250 TABLET ORAL at 09:00

## 2018-03-14 RX ADMIN — CARBIDOPA AND LEVODOPA SCH TAB: 25; 250 TABLET ORAL at 17:00

## 2018-03-14 RX ADMIN — QUETIAPINE SCH MG: 100 TABLET, FILM COATED ORAL at 20:11

## 2018-03-14 RX ADMIN — Medication SCH MG: at 21:00

## 2018-03-14 RX ADMIN — DOCUSATE SODIUM SCH MG: 100 CAPSULE, LIQUID FILLED ORAL at 09:00

## 2018-03-14 NOTE — NUR
PATIENT WOKE UP AND STARTED PACING THE HALLWAY, HE WAS NOTED EASILY IRRITABLE, FLIGHT OF 
IDEAS, DISORGANIZED. NO AGGRESSIVE BX NOTED AT THIS TIME. WILL CONTINUE TO MONITOR CLOSELY.

## 2018-03-14 NOTE — NUR
PT SLEPT FOR APPROX 4.30 HRS THROUGH THE NIGHT. HE WAS NOTED TALKING TO HIMSELF, RESPONDING 
TO VERBAL STIMULI. WILL CONTINUE TO MONITOR.

## 2018-03-14 NOTE — NUR
PT CONTINUE PACING IN HIS ROOM. EASILY IRRITABLE, FLIGHT OF IDEAS, LABILE BX. HE INITIALLY 
ASKED FOR TYLENOL THEN 2 MIN LATER, HE REFUSED TO TAKE IT. HE WAS NOTED  VERBALLY 
AGGRESSIVE. MULTIPLE REDIRECTION GIVEN, HOWEVER, HE STATED, I DON'T WANT YOU MEDICATION, 
GIVE IT SOMEONE ELSE AND GET OUT OF MY ROOM!!!" WILL CONTINUE TO MONITOR CLOSELY.

## 2018-03-14 NOTE — NUR
received to care, sitting in activity room, talking to self, pleasant upon approach. 
compliant with medications, and staff direction. as of 2200, he appears to be asleep. no 
distress noted. will continue to monitor closely.

## 2018-03-15 VITALS — DIASTOLIC BLOOD PRESSURE: 64 MMHG | SYSTOLIC BLOOD PRESSURE: 124 MMHG

## 2018-03-15 VITALS — DIASTOLIC BLOOD PRESSURE: 75 MMHG | SYSTOLIC BLOOD PRESSURE: 154 MMHG

## 2018-03-15 VITALS — SYSTOLIC BLOOD PRESSURE: 165 MMHG | DIASTOLIC BLOOD PRESSURE: 72 MMHG

## 2018-03-15 VITALS — DIASTOLIC BLOOD PRESSURE: 84 MMHG | SYSTOLIC BLOOD PRESSURE: 159 MMHG

## 2018-03-15 RX ADMIN — DOCUSATE SODIUM SCH MG: 100 CAPSULE, LIQUID FILLED ORAL at 09:00

## 2018-03-15 RX ADMIN — HALOPERIDOL SCH MG: 2 TABLET ORAL at 11:00

## 2018-03-15 RX ADMIN — Medication SCH MG: at 17:00

## 2018-03-15 RX ADMIN — BACITRACIN ZINC AND POLYMYXIN B SULFATE SCH APPLIC: 500; 10000 OINTMENT TOPICAL at 09:00

## 2018-03-15 RX ADMIN — CARBIDOPA AND LEVODOPA SCH TAB: 25; 250 TABLET ORAL at 13:00

## 2018-03-15 RX ADMIN — BACITRACIN ZINC AND POLYMYXIN B SULFATE SCH APPLIC: 500; 10000 OINTMENT TOPICAL at 21:00

## 2018-03-15 RX ADMIN — QUETIAPINE SCH MG: 100 TABLET, FILM COATED ORAL at 21:00

## 2018-03-15 RX ADMIN — CARBIDOPA AND LEVODOPA SCH TAB: 25; 250 TABLET ORAL at 17:00

## 2018-03-15 RX ADMIN — CARBIDOPA AND LEVODOPA SCH TAB: 25; 250 TABLET ORAL at 21:00

## 2018-03-15 RX ADMIN — HALOPERIDOL SCH MG: 2 TABLET ORAL at 13:00

## 2018-03-15 RX ADMIN — QUETIAPINE SCH MG: 25 TABLET, FILM COATED ORAL at 08:59

## 2018-03-15 RX ADMIN — LAMOTRIGINE SCH MG: 100 TABLET ORAL at 21:00

## 2018-03-15 RX ADMIN — LAMOTRIGINE SCH MG: 100 TABLET ORAL at 08:58

## 2018-03-15 RX ADMIN — Medication SCH MG: at 08:59

## 2018-03-15 RX ADMIN — CARBIDOPA AND LEVODOPA SCH TAB: 25; 250 TABLET ORAL at 09:10

## 2018-03-15 RX ADMIN — DIVALPROEX SODIUM SCH MG: 250 TABLET, EXTENDED RELEASE ORAL at 17:00

## 2018-03-15 RX ADMIN — Medication SCH MG: at 11:00

## 2018-03-15 RX ADMIN — HALOPERIDOL SCH MG: 2 TABLET ORAL at 17:00

## 2018-03-15 RX ADMIN — THERA TABS SCH UDTAB: TAB at 09:00

## 2018-03-15 RX ADMIN — NICOTINE SCH MG: 21 PATCH, EXTENDED RELEASE TOPICAL at 08:58

## 2018-03-15 RX ADMIN — DIVALPROEX SODIUM SCH MG: 250 TABLET, EXTENDED RELEASE ORAL at 08:58

## 2018-03-15 NOTE — NUR
Gps/Lvn- patient never stop talking, loud, labile mood,extreme agitation  difficulty 
redirecting patient, disruptive , verbally abusive to staff, patient calling 911,unable to 
contract for safety . Charged Nurse called Psychiatrist, informed of patient' behavior  
orders received.

## 2018-03-15 NOTE — NUR
pt is now awake, ambulating in the hallway. b/p was checked, and it was 124/64, hr 84. pt 
denies any dizziness, or lightheadedness. bedtime snack was given, and he was assisted back 
to his room. 1;1 interaction was provided for a few minutes. he is now, in his room,talking 
to himself. remains calm. no distress noted. will continue to monitor closely.

## 2018-03-15 NOTE — NUR
pt is very agitated, yelling out obscenities, refusing his medications, threatening to 
strike staff. difficult to redirect. threw trash all over his room, and the hallway. states 
"im going to kick your ass", when redirected. Dr Cee was paged.

## 2018-03-15 NOTE — NUR
assisted to bed, but immediately tried to get out of bed. gait remains unsteady. assisted to 
the bathroom, and fluids given. due to his noncompliance with staying in bed, he was 
assisted back in the rosio chair, for safety. continues to yell and curse at staff 
intermittently.

## 2018-03-15 NOTE — NUR
Gps/Lvn- Right foot swollen ,noted some redness, denies pain, encouraged to wear socks , 
patient refused. Refused routine afternoon medications. Reviewed with patient, still 
refused, claimed he does not need any medications, and he needs to leave.

## 2018-03-15 NOTE — NUR
appears slightly calmer. remains hyperverbal, talking incoherently. remains in hallway for 
safety. will continue to monitor closely.

## 2018-03-15 NOTE — NUR
IM zyprexa 10 mg/ ativan 2 mg, given IM, per MD orders. pt complied with procedure, with 
program staff, and 2 security officers present. pt is now up in rosio chair, in hallway, 
talking to self, making verbal threats, to staff. will continue to monitor closely, for 
safety.

## 2018-03-15 NOTE — NUR
PT WAS OBSERVED, PACING IN THE HALLWAY, BEING LOUD, HYPERVERBAL, DISRUPTIVE, TANGENTIAL 
SPEECH, REFUSES TO WEAR SOCKS, YELLING ABOUT BASKETBALL TEAM AND DEMANDS HIS SHOES WITH SHOE 
LACES WITH THEM. PT WAS REDIRECTED MULTIPLE TIMES, PT IS VERBALLY OFFENSIVE TO STAFF, 
CALLING THEM 'STUPID'. PT ABUSES HIS RIGHTS BUT CONSTANTLY CALLING, INCLUDING PLACING 911 
CALLS. PT IS REFUSING PO MEDICATIONS. DR. HENSLEY CONTACTED, ORDER FOR BENADRYL 25MG AND 
HALDOL 5 MG IM WAS RECEIVED. MEDICATIONS ADMINISTERED AT 1546 WITH ASSISTANCE OF 2 SECURITY 
GUARDS. PT YELLS AND THREATENED TO NICK FOR 5 MILLION DOLLARS. SCREAMING "HOPE YOU DIE!" 
HANDS PUT ON THE PATIENT FOR THE TOTAL OF 3 MINUTES. PT IS AGITATED, REFUSES VITAL SIGNS. AT 
1620, PT WAS NOTED TO THROW MAGAZINES AND PAPER AROUND HIS ROOM. AT 1630 PT IS STILL PACING 
THE UNIT AND CONSTANTLY TALKING.

## 2018-03-15 NOTE — NUR
remains awake. threw trash and linens all over the whole room. staff confronted him, and 
told him that his room was to be moved, because of a female being admitted to his room. he 
refused to change rooms. when hospital security approached, he complied with moving rooms, 
but gait appeared unsteady, so he was placed in the rosio chair, for safety. currently in his 
room, talking to himself, loudly. placed in the day room, due to noisiness, and disturbing 
his room mate. will continue to monitor closely for safety.

## 2018-03-15 NOTE — NUR
REMAINS AGITATED. PULLED FIRE ALARM IN DINING ROOM, YELLING THAT [facility] IS 
STAGING A NUCLEAR ATTACK ON VIC.

## 2018-03-15 NOTE — NUR
Gps/Lvn- patient continue to be labile, talking loud, refusing routine patient medication , 
constantly on the hallway phone,banging it, no dial tone per patient.Constantly asking for 
his shoes with the shoe laces on per patient, informed he can have the shoes w/o shoe laces 
,per patient"then i dont want it, i will this place". Constantlly talking to himself in his 
room. Continue to monitor patient behavior.

## 2018-03-15 NOTE — NUR
REMAINS AWAKE, TALKING TO SELF. AMBULATING IN HALLWAY, WITH A LOUD VOICE. REDIRECTED BACK TO 
HIS ROOM. PRN MEDICATIONS WERE OFFERED, BUT HE DECLINED. CURRENTLY WRITING ON A PIECE OF 
PAPER. WILL CONTINUE TO MONITOR CLOSELY.

## 2018-03-16 ENCOUNTER — HOSPITAL ENCOUNTER (EMERGENCY)
Dept: HOSPITAL 12 - ER | Age: 68
Discharge: HOME | End: 2018-03-16
Payer: MEDICARE

## 2018-03-16 VITALS — DIASTOLIC BLOOD PRESSURE: 76 MMHG | SYSTOLIC BLOOD PRESSURE: 133 MMHG

## 2018-03-16 VITALS — SYSTOLIC BLOOD PRESSURE: 129 MMHG | DIASTOLIC BLOOD PRESSURE: 65 MMHG

## 2018-03-16 VITALS — WEIGHT: 180 LBS | HEIGHT: 74 IN | BODY MASS INDEX: 23.1 KG/M2

## 2018-03-16 VITALS — SYSTOLIC BLOOD PRESSURE: 120 MMHG | DIASTOLIC BLOOD PRESSURE: 66 MMHG

## 2018-03-16 DIAGNOSIS — S01.01XA: Primary | ICD-10-CM

## 2018-03-16 DIAGNOSIS — Z79.1: ICD-10-CM
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
## 2018-03-16 DIAGNOSIS — Z79.899: ICD-10-CM

## 2018-03-16 PROCEDURE — 99284 EMERGENCY DEPT VISIT MOD MDM: CPT

## 2018-03-16 PROCEDURE — 12002 RPR S/N/AX/GEN/TRNK2.6-7.5CM: CPT

## 2018-03-16 PROCEDURE — A4663 DIALYSIS BLOOD PRESSURE CUFF: HCPCS

## 2018-03-16 PROCEDURE — 70450 CT HEAD/BRAIN W/O DYE: CPT

## 2018-03-16 PROCEDURE — A4217 STERILE WATER/SALINE, 500 ML: HCPCS

## 2018-03-16 RX ADMIN — LAMOTRIGINE SCH MG: 100 TABLET ORAL at 09:00

## 2018-03-16 RX ADMIN — Medication SCH MG: at 17:06

## 2018-03-16 RX ADMIN — CLONAZEPAM SCH MG: 0.5 TABLET ORAL at 17:06

## 2018-03-16 RX ADMIN — HALOPERIDOL SCH MG: 2 TABLET ORAL at 17:06

## 2018-03-16 RX ADMIN — CLONAZEPAM SCH MG: 0.5 TABLET ORAL at 10:21

## 2018-03-16 RX ADMIN — CARBIDOPA AND LEVODOPA SCH TAB: 25; 250 TABLET ORAL at 10:21

## 2018-03-16 RX ADMIN — CARBIDOPA AND LEVODOPA SCH TAB: 25; 250 TABLET ORAL at 12:58

## 2018-03-16 RX ADMIN — HALOPERIDOL SCH MG: 2 TABLET ORAL at 09:00

## 2018-03-16 RX ADMIN — CARBIDOPA AND LEVODOPA SCH TAB: 25; 250 TABLET ORAL at 20:43

## 2018-03-16 RX ADMIN — THERA TABS SCH UDTAB: TAB at 09:00

## 2018-03-16 RX ADMIN — CARBIDOPA AND LEVODOPA SCH TAB: 25; 250 TABLET ORAL at 09:31

## 2018-03-16 RX ADMIN — Medication SCH MG: at 10:21

## 2018-03-16 RX ADMIN — Medication SCH MG: at 09:00

## 2018-03-16 RX ADMIN — BACITRACIN ZINC AND POLYMYXIN B SULFATE SCH APPLIC: 500; 10000 OINTMENT TOPICAL at 20:44

## 2018-03-16 RX ADMIN — DOCUSATE SODIUM SCH MG: 100 CAPSULE, LIQUID FILLED ORAL at 09:00

## 2018-03-16 RX ADMIN — Medication SCH MG: at 21:59

## 2018-03-16 RX ADMIN — LAMOTRIGINE SCH MG: 100 TABLET ORAL at 10:21

## 2018-03-16 RX ADMIN — LAMOTRIGINE SCH MG: 100 TABLET ORAL at 20:42

## 2018-03-16 RX ADMIN — QUETIAPINE SCH MG: 100 TABLET, FILM COATED ORAL at 20:44

## 2018-03-16 RX ADMIN — DIVALPROEX SODIUM SCH MG: 250 TABLET, EXTENDED RELEASE ORAL at 09:00

## 2018-03-16 RX ADMIN — Medication SCH MG: at 10:22

## 2018-03-16 RX ADMIN — HALOPERIDOL SCH MG: 2 TABLET ORAL at 12:59

## 2018-03-16 RX ADMIN — BACITRACIN ZINC AND POLYMYXIN B SULFATE SCH APPLIC: 500; 10000 OINTMENT TOPICAL at 09:00

## 2018-03-16 RX ADMIN — CARBIDOPA AND LEVODOPA SCH TAB: 25; 250 TABLET ORAL at 17:06

## 2018-03-16 RX ADMIN — DIVALPROEX SODIUM SCH MG: 250 TABLET, EXTENDED RELEASE ORAL at 18:12

## 2018-03-16 RX ADMIN — CLONAZEPAM SCH MG: 0.5 TABLET ORAL at 12:59

## 2018-03-16 RX ADMIN — DIVALPROEX SODIUM SCH MG: 250 TABLET, EXTENDED RELEASE ORAL at 10:21

## 2018-03-16 RX ADMIN — NICOTINE SCH MG: 21 PATCH, EXTENDED RELEASE TOPICAL at 09:00

## 2018-03-16 RX ADMIN — HALOPERIDOL SCH MG: 2 TABLET ORAL at 10:21

## 2018-03-16 NOTE — NUR
Patient discharged to MENTAL HEALTH UNIT in stable conditon.  Written and 
verbal after care instructions given to MHU nurse Charo. Patient's nurse 
verbalizes understanding of instructions.

## 2018-03-16 NOTE — NUR
Patient is able to move from rosio-chair to gurney with 2 person assist. Comfort 
and safety measures maintained.

## 2018-03-16 NOTE — NUR
pt was much calmer, by 0500. he was assisted with a shower, and placed in bed, with the 
alarm on. gait remains unsteady. as of 0530, he appears to be asleep. no distress noted. 
will continue to monitor closely.

## 2018-03-16 NOTE — NUR
Gps/Lvn- Unable to administer routine am medications, patient asleep, will reoffer at a 
later time when awake. Patient asleep in bed, with bed alarm on, continue to monitor closely 
for safety.

## 2018-03-16 NOTE — NUR
bed alarm went off, at 0705. staff went in to investigate, and pt was found on the floor. he 
stated he was trying to urinate in the toilet. he had a laceration to the back of the head. 
he was placed back in the rosio chair, for safety. area was cleansed and ice was applied. 
nursing supervisor was notified. Dr Tamayo was notified. he ordered for him to be 
taken to the emergency room, for evaluation. Dr Cee was also notified of incident.

## 2018-03-16 NOTE — NUR
Gps/Lvn- Eating dinner at the activity room, pleasant ,smiling, compliant with his routine 
medications, apologized from yesterdays'  behavior.Continue to monitor safety,denies pain 
,no discomfort at this time.

## 2018-03-16 NOTE — NUR
Patient noted to be walking up and down the patricio, and then at the activity room with other 
patients. Could go from being pleasant to aggressive and needs to be directed. Took his meds 
except Metoprolol. VS WNL. Will continue to monitor.

## 2018-03-16 NOTE — NUR
has been napping on and off for a few minutes at a time. each time he wakes back up, he 
continues to yell and curse. difficult to redirect. stated "im going to yell all night and 
wake everyone up" placed in empty room a [facility] station, for closer monitoring. will 
continue to monitor closely.

## 2018-03-16 NOTE — NUR
gps/lvn-Received patient back from ER via gurmicky, with surgical carlos on, patient asleep, no 
distress. Report received from Teddy Lee(ER). Has 3 staples intact will keep 10-14 days as 
ordered as ordered. No further bleeding noted at this time.Bed alarm on, continue to monitor 
safety.

## 2018-03-16 NOTE — NUR
Gps/Lvn- Ambulates around pacing, anxious, irritable and remains to have episodes where in 
patient calling sitter names, discouraged from doing so.Patient removed dressing /surginet 
from his occipital laceration , refused dressings, no bleeding noted, staples intact.

## 2018-03-16 NOTE — NUR
received to care, watching tv in activity room. 1;1 sitter at his side, for safety. 
compliant with medications and staff direction. will continue to monitor closely.

## 2018-03-17 VITALS — DIASTOLIC BLOOD PRESSURE: 55 MMHG | SYSTOLIC BLOOD PRESSURE: 92 MMHG

## 2018-03-17 VITALS — DIASTOLIC BLOOD PRESSURE: 78 MMHG | SYSTOLIC BLOOD PRESSURE: 120 MMHG

## 2018-03-17 VITALS — SYSTOLIC BLOOD PRESSURE: 105 MMHG | DIASTOLIC BLOOD PRESSURE: 67 MMHG

## 2018-03-17 VITALS — DIASTOLIC BLOOD PRESSURE: 56 MMHG | SYSTOLIC BLOOD PRESSURE: 93 MMHG

## 2018-03-17 RX ADMIN — Medication SCH MG: at 08:29

## 2018-03-17 RX ADMIN — LAMOTRIGINE SCH MG: 100 TABLET ORAL at 20:46

## 2018-03-17 RX ADMIN — CARBIDOPA AND LEVODOPA SCH TAB: 25; 250 TABLET ORAL at 17:03

## 2018-03-17 RX ADMIN — HALOPERIDOL SCH MG: 2 TABLET ORAL at 17:03

## 2018-03-17 RX ADMIN — LAMOTRIGINE SCH MG: 100 TABLET ORAL at 08:28

## 2018-03-17 RX ADMIN — ACETAMINOPHEN PRN MG: 500 TABLET ORAL at 09:55

## 2018-03-17 RX ADMIN — NICOTINE SCH MG: 21 PATCH, EXTENDED RELEASE TOPICAL at 08:29

## 2018-03-17 RX ADMIN — BACITRACIN ZINC AND POLYMYXIN B SULFATE SCH APPLIC: 500; 10000 OINTMENT TOPICAL at 09:07

## 2018-03-17 RX ADMIN — THERA TABS SCH UDTAB: TAB at 08:28

## 2018-03-17 RX ADMIN — CLONAZEPAM SCH MG: 0.5 TABLET ORAL at 17:03

## 2018-03-17 RX ADMIN — Medication SCH MG: at 20:42

## 2018-03-17 RX ADMIN — CARBIDOPA AND LEVODOPA SCH TAB: 25; 250 TABLET ORAL at 20:46

## 2018-03-17 RX ADMIN — DIVALPROEX SODIUM SCH MG: 250 TABLET, EXTENDED RELEASE ORAL at 17:02

## 2018-03-17 RX ADMIN — BACITRACIN, NEOMYCIN, POLYMYXIN B SCH GM: 400; 3.5; 5 OINTMENT TOPICAL at 08:30

## 2018-03-17 RX ADMIN — CARBIDOPA AND LEVODOPA SCH TAB: 25; 250 TABLET ORAL at 12:29

## 2018-03-17 RX ADMIN — DIVALPROEX SODIUM SCH MG: 250 TABLET, EXTENDED RELEASE ORAL at 08:28

## 2018-03-17 RX ADMIN — DOCUSATE SODIUM SCH MG: 100 CAPSULE, LIQUID FILLED ORAL at 08:29

## 2018-03-17 RX ADMIN — HALOPERIDOL SCH MG: 2 TABLET ORAL at 08:28

## 2018-03-17 RX ADMIN — CARBIDOPA AND LEVODOPA SCH TAB: 25; 250 TABLET ORAL at 08:29

## 2018-03-17 RX ADMIN — QUETIAPINE SCH MG: 100 TABLET, FILM COATED ORAL at 20:46

## 2018-03-17 RX ADMIN — NICOTINE SCH MG: 21 PATCH, EXTENDED RELEASE TOPICAL at 09:00

## 2018-03-17 RX ADMIN — CLONAZEPAM SCH MG: 0.5 TABLET ORAL at 08:29

## 2018-03-17 RX ADMIN — Medication SCH MG: at 17:03

## 2018-03-17 RX ADMIN — CLONAZEPAM SCH MG: 0.5 TABLET ORAL at 12:29

## 2018-03-17 RX ADMIN — HALOPERIDOL SCH MG: 2 TABLET ORAL at 12:29

## 2018-03-17 RX ADMIN — BACITRACIN ZINC AND POLYMYXIN B SULFATE SCH APPLIC: 500; 10000 OINTMENT TOPICAL at 20:46

## 2018-03-17 NOTE — NUR
pt is now awake, ambulating in the hallway, with an slightly unsteady gait, attempting to  
take items off the wall, in the ddining room. difficult to redirect. when asked to go back 
to bed, he said, "there's another option. i can punch you in the face." pt continued to 
wander, so he was placed in the rosio chair, for safety. sitter remains at his side. 
currently putting hand lotion on, talking to self. remains calm. no distress noted. will 
continue to monitor closely.

## 2018-03-18 VITALS — DIASTOLIC BLOOD PRESSURE: 61 MMHG | SYSTOLIC BLOOD PRESSURE: 140 MMHG

## 2018-03-18 VITALS — SYSTOLIC BLOOD PRESSURE: 129 MMHG | DIASTOLIC BLOOD PRESSURE: 75 MMHG

## 2018-03-18 VITALS — SYSTOLIC BLOOD PRESSURE: 113 MMHG | DIASTOLIC BLOOD PRESSURE: 63 MMHG

## 2018-03-18 LAB
ALP SERPL-CCNC: 64 U/L (ref 50–136)
ALT SERPL W/O P-5'-P-CCNC: 14 U/L (ref 16–63)
AST SERPL-CCNC: 21 U/L (ref 15–37)
BASOPHILS # BLD AUTO: 0 K/UL (ref 0–8)
BASOPHILS NFR BLD AUTO: 0.6 % (ref 0–2)
BILIRUB SERPL-MCNC: 0.3 MG/DL (ref 0.2–1)
BUN SERPL-MCNC: 23 MG/DL (ref 7–18)
CHLORIDE SERPL-SCNC: 113 MMOL/L (ref 98–107)
CK SERPL-CCNC: 148 U/L (ref 39–308)
CO2 SERPL-SCNC: 27 MMOL/L (ref 21–32)
CREAT SERPL-MCNC: 1.9 MG/DL (ref 0.6–1.3)
EOSINOPHIL # BLD AUTO: 0.2 K/UL (ref 0–0.7)
EOSINOPHIL NFR BLD AUTO: 2.1 % (ref 0–7)
GLUCOSE SERPL-MCNC: 101 MG/DL (ref 74–106)
HCT VFR BLD AUTO: 36.6 % (ref 36.7–47.1)
HGB BLD-MCNC: 12.1 G/DL (ref 12.5–16.3)
LYMPHOCYTES # BLD AUTO: 1.3 K/UL (ref 20–40)
LYMPHOCYTES NFR BLD AUTO: 15.9 % (ref 20.5–51.5)
MAGNESIUM SERPL-MCNC: 2.2 MG/DL (ref 1.8–2.4)
MCH RBC QN AUTO: 31 UUG (ref 23.8–33.4)
MCHC RBC AUTO-ENTMCNC: 33 G/DL (ref 32.5–36.3)
MCV RBC AUTO: 93.5 FL (ref 73–96.2)
MONOCYTES # BLD AUTO: 0.9 K/UL (ref 2–10)
MONOCYTES NFR BLD AUTO: 10.6 % (ref 0–11)
NEUTROPHILS # BLD AUTO: 5.8 K/UL (ref 1.8–8.9)
NEUTROPHILS NFR BLD AUTO: 70.8 % (ref 38.5–71.5)
PHOSPHATE SERPL-MCNC: 3.3 MG/DL (ref 2.5–4.9)
PLATELET # BLD AUTO: 207 K/UL (ref 152–348)
POTASSIUM SERPL-SCNC: 4.9 MMOL/L (ref 3.5–5.1)
RBC # BLD AUTO: 3.91 MIL/UL (ref 4.06–5.63)
WBC # BLD AUTO: 8.2 K/UL (ref 3.6–10.2)
WS STN SPEC: 6.7 G/DL (ref 6.4–8.2)

## 2018-03-18 RX ADMIN — CLONAZEPAM SCH MG: 0.5 TABLET ORAL at 12:59

## 2018-03-18 RX ADMIN — THERA TABS SCH UDTAB: TAB at 08:27

## 2018-03-18 RX ADMIN — CLONAZEPAM SCH MG: 0.5 TABLET ORAL at 08:27

## 2018-03-18 RX ADMIN — BACITRACIN, NEOMYCIN, POLYMYXIN B SCH GM: 400; 3.5; 5 OINTMENT TOPICAL at 08:37

## 2018-03-18 RX ADMIN — Medication SCH MG: at 08:36

## 2018-03-18 RX ADMIN — CARBIDOPA AND LEVODOPA SCH TAB: 25; 250 TABLET ORAL at 12:59

## 2018-03-18 RX ADMIN — CARBIDOPA AND LEVODOPA SCH TAB: 25; 250 TABLET ORAL at 17:26

## 2018-03-18 RX ADMIN — HALOPERIDOL SCH MG: 2 TABLET ORAL at 12:59

## 2018-03-18 RX ADMIN — Medication SCH MG: at 17:26

## 2018-03-18 RX ADMIN — QUETIAPINE SCH MG: 100 TABLET, FILM COATED ORAL at 20:06

## 2018-03-18 RX ADMIN — BACITRACIN ZINC AND POLYMYXIN B SULFATE SCH APPLIC: 500; 10000 OINTMENT TOPICAL at 08:37

## 2018-03-18 RX ADMIN — Medication SCH MG: at 08:27

## 2018-03-18 RX ADMIN — HALOPERIDOL SCH MG: 2 TABLET ORAL at 17:26

## 2018-03-18 RX ADMIN — CARBIDOPA AND LEVODOPA SCH TAB: 25; 250 TABLET ORAL at 08:26

## 2018-03-18 RX ADMIN — CLONAZEPAM SCH MG: 0.5 TABLET ORAL at 17:27

## 2018-03-18 RX ADMIN — DOCUSATE SODIUM SCH MG: 100 CAPSULE, LIQUID FILLED ORAL at 08:27

## 2018-03-18 RX ADMIN — HALOPERIDOL SCH MG: 2 TABLET ORAL at 08:27

## 2018-03-18 RX ADMIN — LAMOTRIGINE SCH MG: 100 TABLET ORAL at 20:06

## 2018-03-18 RX ADMIN — DIVALPROEX SODIUM SCH MG: 250 TABLET, EXTENDED RELEASE ORAL at 17:26

## 2018-03-18 RX ADMIN — BACITRACIN ZINC AND POLYMYXIN B SULFATE SCH APPLIC: 500; 10000 OINTMENT TOPICAL at 20:41

## 2018-03-18 RX ADMIN — CARBIDOPA AND LEVODOPA SCH TAB: 25; 250 TABLET ORAL at 20:05

## 2018-03-18 RX ADMIN — LAMOTRIGINE SCH MG: 100 TABLET ORAL at 08:27

## 2018-03-18 RX ADMIN — DIVALPROEX SODIUM SCH MG: 250 TABLET, EXTENDED RELEASE ORAL at 08:26

## 2018-03-18 NOTE — NUR
Gps/Lvn- Remains with 1:1 Nursing supervision for safety, unsteady , safety continue to 
review emphasized.

## 2018-03-18 NOTE — NUR
Patient remains with a 1:1 sitter for safety. Patient pleasant, calm, and cooperative 
patient is redirectable. Patient complain wtih medication. Patient in no apparent distress 
will continue to monitor. No aggressive or combative behavior noted will continue to 
monitor.

## 2018-03-19 VITALS — SYSTOLIC BLOOD PRESSURE: 123 MMHG | DIASTOLIC BLOOD PRESSURE: 82 MMHG

## 2018-03-19 VITALS — DIASTOLIC BLOOD PRESSURE: 72 MMHG | SYSTOLIC BLOOD PRESSURE: 118 MMHG

## 2018-03-19 VITALS — DIASTOLIC BLOOD PRESSURE: 76 MMHG | SYSTOLIC BLOOD PRESSURE: 128 MMHG

## 2018-03-19 RX ADMIN — BACITRACIN, NEOMYCIN, POLYMYXIN B SCH GM: 400; 3.5; 5 OINTMENT TOPICAL at 09:32

## 2018-03-19 RX ADMIN — CARBIDOPA AND LEVODOPA SCH TAB: 25; 250 TABLET ORAL at 20:17

## 2018-03-19 RX ADMIN — CARBIDOPA AND LEVODOPA SCH TAB: 25; 250 TABLET ORAL at 17:26

## 2018-03-19 RX ADMIN — Medication SCH MG: at 10:25

## 2018-03-19 RX ADMIN — THERA TABS SCH UDTAB: TAB at 08:53

## 2018-03-19 RX ADMIN — DOCUSATE SODIUM SCH MG: 100 CAPSULE, LIQUID FILLED ORAL at 08:54

## 2018-03-19 RX ADMIN — LAMOTRIGINE SCH MG: 100 TABLET ORAL at 20:17

## 2018-03-19 RX ADMIN — CLONAZEPAM SCH MG: 0.5 TABLET ORAL at 17:26

## 2018-03-19 RX ADMIN — LAMOTRIGINE SCH MG: 100 TABLET ORAL at 08:53

## 2018-03-19 RX ADMIN — CLONAZEPAM SCH MG: 0.5 TABLET ORAL at 12:16

## 2018-03-19 RX ADMIN — Medication SCH MG: at 17:26

## 2018-03-19 RX ADMIN — BACITRACIN ZINC AND POLYMYXIN B SULFATE SCH APPLIC: 500; 10000 OINTMENT TOPICAL at 20:22

## 2018-03-19 RX ADMIN — DIVALPROEX SODIUM SCH MG: 250 TABLET, EXTENDED RELEASE ORAL at 17:27

## 2018-03-19 RX ADMIN — BACITRACIN ZINC AND POLYMYXIN B SULFATE SCH APPLIC: 500; 10000 OINTMENT TOPICAL at 09:32

## 2018-03-19 RX ADMIN — DIVALPROEX SODIUM SCH MG: 250 TABLET, EXTENDED RELEASE ORAL at 08:53

## 2018-03-19 RX ADMIN — HALOPERIDOL SCH MG: 5 TABLET ORAL at 08:55

## 2018-03-19 RX ADMIN — Medication SCH MG: at 08:53

## 2018-03-19 RX ADMIN — CARBIDOPA AND LEVODOPA SCH TAB: 25; 250 TABLET ORAL at 12:16

## 2018-03-19 RX ADMIN — HALOPERIDOL SCH MG: 5 TABLET ORAL at 17:26

## 2018-03-19 RX ADMIN — CLONAZEPAM SCH MG: 0.5 TABLET ORAL at 08:54

## 2018-03-19 RX ADMIN — QUETIAPINE SCH MG: 100 TABLET, FILM COATED ORAL at 20:17

## 2018-03-19 RX ADMIN — CARBIDOPA AND LEVODOPA SCH TAB: 25; 250 TABLET ORAL at 08:54

## 2018-03-19 NOTE — NUR
Patient remains with a 1:1 sitter for safety. Patient pleasant, calm, and 

cooperative patient is redirectable. Patient complaint with medication.  Poor impulse 
control, poor judgement.Patient in no apparent distress will continue to monitor. No 
aggressive or 

combative behavior noted will continue to monitor.

## 2018-03-20 VITALS — SYSTOLIC BLOOD PRESSURE: 121 MMHG | DIASTOLIC BLOOD PRESSURE: 82 MMHG

## 2018-03-20 VITALS — SYSTOLIC BLOOD PRESSURE: 128 MMHG | DIASTOLIC BLOOD PRESSURE: 72 MMHG

## 2018-03-20 VITALS — SYSTOLIC BLOOD PRESSURE: 149 MMHG | DIASTOLIC BLOOD PRESSURE: 85 MMHG

## 2018-03-20 RX ADMIN — QUETIAPINE SCH MG: 100 TABLET, FILM COATED ORAL at 20:31

## 2018-03-20 RX ADMIN — CLONAZEPAM SCH MG: 0.5 TABLET ORAL at 12:12

## 2018-03-20 RX ADMIN — ALUMINUM HYDROXIDE, MAGNESIUM HYDROXIDE, AND SIMETHICONE PRN ML: 200; 200; 20 SUSPENSION ORAL at 08:57

## 2018-03-20 RX ADMIN — Medication SCH MG: at 08:27

## 2018-03-20 RX ADMIN — CLONAZEPAM SCH MG: 0.5 TABLET ORAL at 16:58

## 2018-03-20 RX ADMIN — Medication SCH MG: at 16:58

## 2018-03-20 RX ADMIN — CARBIDOPA AND LEVODOPA SCH TAB: 25; 250 TABLET ORAL at 16:58

## 2018-03-20 RX ADMIN — DOCUSATE SODIUM SCH MG: 100 CAPSULE, LIQUID FILLED ORAL at 08:27

## 2018-03-20 RX ADMIN — DIVALPROEX SODIUM SCH MG: 250 TABLET, EXTENDED RELEASE ORAL at 17:04

## 2018-03-20 RX ADMIN — LAMOTRIGINE SCH MG: 100 TABLET ORAL at 08:27

## 2018-03-20 RX ADMIN — BACITRACIN, NEOMYCIN, POLYMYXIN B SCH GM: 400; 3.5; 5 OINTMENT TOPICAL at 08:28

## 2018-03-20 RX ADMIN — LAMOTRIGINE SCH MG: 100 TABLET ORAL at 20:31

## 2018-03-20 RX ADMIN — CARBIDOPA AND LEVODOPA SCH TAB: 25; 250 TABLET ORAL at 20:31

## 2018-03-20 RX ADMIN — BACITRACIN ZINC AND POLYMYXIN B SULFATE SCH APPLIC: 500; 10000 OINTMENT TOPICAL at 08:28

## 2018-03-20 RX ADMIN — CARBIDOPA AND LEVODOPA SCH TAB: 25; 250 TABLET ORAL at 08:27

## 2018-03-20 RX ADMIN — CARBIDOPA AND LEVODOPA SCH TAB: 25; 250 TABLET ORAL at 12:12

## 2018-03-20 RX ADMIN — HALOPERIDOL SCH MG: 5 TABLET ORAL at 08:27

## 2018-03-20 RX ADMIN — DIVALPROEX SODIUM SCH MG: 250 TABLET, EXTENDED RELEASE ORAL at 08:27

## 2018-03-20 RX ADMIN — CLONAZEPAM SCH MG: 0.5 TABLET ORAL at 08:27

## 2018-03-20 RX ADMIN — THERA TABS SCH UDTAB: TAB at 08:27

## 2018-03-20 RX ADMIN — HALOPERIDOL SCH MG: 5 TABLET ORAL at 16:58

## 2018-03-20 RX ADMIN — Medication SCH MG: at 08:28

## 2018-03-20 RX ADMIN — BACITRACIN ZINC AND POLYMYXIN B SULFATE SCH APPLIC: 500; 10000 OINTMENT TOPICAL at 20:59

## 2018-03-20 NOTE — NUR
RECEIVED PATIENT IN THE HALLWAY, HE IS NOTED A/O X 3 ABLE TO AMBULATE WITH STEADY GAIT AND 
ABLE TO MAKE HIS NEEDS KNOWN. HE IS NOTED PLEASANT, COOPERATIVE WITH MEDICATION REGIMENT, 
BRIGHT  AFFECT. SAFETY EMPHASIS, BED AT LOWEST POSITION, ALARM ON FREQUENTS HEAD CHECKS.

## 2018-03-21 VITALS — SYSTOLIC BLOOD PRESSURE: 159 MMHG | DIASTOLIC BLOOD PRESSURE: 79 MMHG

## 2018-03-21 RX ADMIN — THERA TABS SCH UDTAB: TAB at 09:00

## 2018-03-21 RX ADMIN — HALOPERIDOL SCH MG: 5 TABLET ORAL at 09:11

## 2018-03-21 RX ADMIN — THERA TABS SCH UDTAB: TAB at 09:11

## 2018-03-21 RX ADMIN — DOCUSATE SODIUM SCH MG: 100 CAPSULE, LIQUID FILLED ORAL at 09:00

## 2018-03-21 RX ADMIN — LAMOTRIGINE SCH MG: 100 TABLET ORAL at 09:11

## 2018-03-21 RX ADMIN — CARBIDOPA AND LEVODOPA SCH TAB: 25; 250 TABLET ORAL at 13:28

## 2018-03-21 RX ADMIN — CARBIDOPA AND LEVODOPA SCH TAB: 25; 250 TABLET ORAL at 09:11

## 2018-03-21 RX ADMIN — Medication SCH MG: at 09:00

## 2018-03-21 RX ADMIN — BACITRACIN ZINC AND POLYMYXIN B SULFATE SCH APPLIC: 500; 10000 OINTMENT TOPICAL at 09:25

## 2018-03-21 RX ADMIN — BACITRACIN, NEOMYCIN, POLYMYXIN B SCH GM: 400; 3.5; 5 OINTMENT TOPICAL at 09:10

## 2018-03-21 RX ADMIN — CLONAZEPAM SCH MG: 0.5 TABLET ORAL at 13:28

## 2018-03-21 RX ADMIN — DOCUSATE SODIUM SCH MG: 100 CAPSULE, LIQUID FILLED ORAL at 09:11

## 2018-03-21 RX ADMIN — DIVALPROEX SODIUM SCH MG: 250 TABLET, EXTENDED RELEASE ORAL at 09:00

## 2018-03-21 RX ADMIN — CLONAZEPAM SCH MG: 0.5 TABLET ORAL at 09:11

## 2018-03-21 RX ADMIN — Medication SCH MG: at 09:10

## 2018-03-21 NOTE — NUR
PT SLEPT FOR APPROX 6.30 HRS THROUGH THE NIGHT. HE WAS NOTED INCONTINENT OF URINE X2. NO BX. 
PROBLEMS NOTED DURING THE SHIFT. PT AWARE OF HIS D/C TODAY.

## 2018-03-21 NOTE — NUR
Discharge Note:  Patient will be discharged to [facility] [[address] 
[place] [address]; [phone]] via ambulance at 1pm. Please arrange 
ambulance for this patient. Spoke with Khang at the facility who states they are ready 
to accept the patient today. Patient is aware and agreeable with discharge plans. Patient 
will continue to follow-up with his Primary Care Physician, Dr. Wade Santos [[address], [place] [address]; [phone]] and his Psychiatrist, Dr. Thor Reyes [[address] 
[facility] [place] [address]; [phone]]. Home Health order was faxed to 
Pricila at [facility] (tel. [phone]; fax [phone]). Patient was accepted 
for PT/OT and medication management starting on March 22, 2018. For smoking cessation, the 
patient was provided with referrals to [employer] (104-LUNGUSA) and [employer] 
[employer] (971-432-9970). Patient was also provided with outpatient mental health 
resources to [facility] (1-821.359.5920), [facility] She[facility] (1-912.318.5918), and the 
National Suicide Prevention Lifeline (1-421.299.7890).

## 2018-03-21 NOTE — NUR
report called to MILLIE AT [facility] AT 12 25PM, PATIENT DISCHARGED  VIA 
AMBULANCE WITH ALL BELONGINGS IN NO ACUTE DISTRESS PATIENT AWARE OF TRANSFER AND HE IS 
RETURNING TO SAME FACILITY, sutures clean dry and intac no swelling or drainage or c/o pain 
except tender to touch , all paper work signed  and patient  agreeable